# Patient Record
Sex: MALE | Race: WHITE | NOT HISPANIC OR LATINO
[De-identification: names, ages, dates, MRNs, and addresses within clinical notes are randomized per-mention and may not be internally consistent; named-entity substitution may affect disease eponyms.]

---

## 2021-07-17 ENCOUNTER — HEALTH MAINTENANCE LETTER (OUTPATIENT)
Age: 76
End: 2021-07-17

## 2021-08-09 VITALS — HEIGHT: 70 IN | WEIGHT: 215 LBS | BODY MASS INDEX: 30.78 KG/M2

## 2021-08-09 RX ORDER — WARFARIN SODIUM 10 MG/1
TABLET ORAL
COMMUNITY
Start: 2021-02-16

## 2021-08-09 RX ORDER — SIMVASTATIN 20 MG
20 TABLET ORAL
COMMUNITY
Start: 2021-01-27

## 2021-08-09 RX ORDER — WARFARIN SODIUM 7.5 MG/1
TABLET ORAL
COMMUNITY
Start: 2021-02-16

## 2021-08-09 RX ORDER — METFORMIN HCL 500 MG
500 TABLET, EXTENDED RELEASE 24 HR ORAL
COMMUNITY
Start: 2021-06-07

## 2021-08-09 RX ORDER — ASPIRIN 81 MG/1
81 TABLET, CHEWABLE ORAL
COMMUNITY
End: 2023-08-04

## 2021-08-09 RX ORDER — BUPROPION HYDROCHLORIDE 150 MG/1
TABLET ORAL
COMMUNITY
Start: 2021-06-07 | End: 2023-08-04

## 2021-08-09 RX ORDER — PANTOPRAZOLE SODIUM 40 MG/1
40 TABLET, DELAYED RELEASE ORAL
COMMUNITY
Start: 2021-01-27

## 2021-08-09 RX ORDER — TRIAMTERENE/HYDROCHLOROTHIAZID 37.5-25 MG
1 TABLET ORAL
COMMUNITY
Start: 2021-01-27

## 2021-08-09 ASSESSMENT — MIFFLIN-ST. JEOR: SCORE: 1711.48

## 2021-08-09 NOTE — PROGRESS NOTES
Sae is a 75 year old who is being evaluated via a billable video visit.      How would you like to obtain your AVS? MyChart  If the video visit is dropped, the invitation should be resent by: Send to e-mail at: [email or web address]  Will anyone else be joining your video visit? No        Video-Visit Details    Type of service:  Video Visit  Video Start Time: 1:36 PM  Video End Time:  2:14 PM    Originating Location (pt. Location): Home    Distant Location (provider location):  [facility]     Platform used for Video Visit: Siddhartha ALBERT CMA, SLEEP MEDICINE, 8/9/2021 3:30 PM      Sleep Consultation:    Date on this visit: 8/10/2021    Sae Cano is sent by No ref. provider found for a sleep consultation regarding establish care for HI on CPAP, needs new machine.    Primary Physician: No Ref-Primary, Physician     Sae Cano is a pleasant 75-year-old male with a PMH pertinent for history of pulmonary embolism and infarction, chronic anticoagulation, HTN, HLD, GERD, anxiety, depression, T2DM, BPH, WPW, mild obesity, and HI who reports doing well on CPAP. He is doing well on his CPAP at present and has been on a CPAP for several years, possibly initially seen in 2008.  He is here to establish care for obstructive sleep apnea on CPAP and would like to obtain a new prescription for a new CPAP device, mask and CPAP supplies.  He has a long history of HI with his initial sleep evaluation back in approximately 2008 when he started on CPAP.  His most recent sleep study was in November 11, 2013 through [facility] sleep clinic, however, the data from the report appears to be missing so it is unclear what his most recent AHI, RDI, or oxygen desaturation was during the study.  *8/17/2021 Update: PSG data reviewed and noted below.    Sae goes to sleep at 10:00 - 11:00 PM during the week. He wakes up at 6:00 - 7:00 AM without an alarm. He falls asleep in 5 minutes.  Sae  denies difficulty falling asleep.  He wakes up 1 times a night for variable to unable to return to sleep.  Sae wakes up to go to the bathroom.  On weekends, Sae goes to sleep at about the same time. Patient gets an average of 6 hours of sleep per night.     Patient does use electronics in bed, watch TV in bed and read in bed and does not worry in bed about anything.     Sae is retired, . He previously worked in the [employer] industry.    Sae does not snore with CPAP. Patient does have a regular bed partner. There is not report of snoring, kicking, punching, gasping, choking and snorting.  He does not have witnessed apneas. They never sleep separately.  Patient sleeps on his back and side. He has occasional morning dry mouth, denies occasional sleep disturbance, snort arousals, morning headaches, morning confusion and restless legs. Sae denies any bruxism, sleep walking, sleep talking, dream enactment, sleep paralysis, cataplexy and hypnogogic/hypnopompic hallucinations.    He denies sleep walking, sleep talking, enuresis and sleep terrors as a child.  Sae denies difficulty breathing through his nose, claustrophobia, reflux at night, heartburn and depression.      Sae has lost 0-5 pounds in the last year.  Patient describes themself as a morning person. Patient's [place] Sleepiness score 11/24 consistent with excessive  daytime sleepiness.      Sae naps 1-2 times per week for 30 minutes, feels refreshed after naps. He takes some inadvertant naps.  He denies closing eyes, dozing and falling asleep while driving.   Patient was counseled on the importance of driving while alert, to pull over if drowsy, or nap before getting into the vehicle if sleepy.  He uses 1 cups/day of green tea.    Alcohol use: Drinks maybe 1 beer/week or glass of wine  Nicotine use: None  Recreational Drug use: None    Previous Study Results: [facility]  Date: 11/11/2013.  Weight 215 pounds.  AHI: 52/hr. RDI 55/hr. O2 janee  82%.  Restricted AHI: 37/hr.    PAP machine: [employer] Dream Station Auto Pressure settings: 10-15 cm    The compliance data from 7/10/21 - 8/08/21 shows that the patient used the CPAP for 30/30 nights, 93.3% of nights for >4 hours.  The 90th% pressure is 11.5 cm.  The average time in large leak is 0 secs.  The average nightly usage is 6 hours 43 minutes.  The average AHI is 3.1/hr.     Interface:  Mask: nasal cushion mask  Chin strap: No  Leak: No  Using Humidifier: Yes  Condensation in hose or mask: Yes     Difficulties with therapy:    [-] Snoring with CPAP: None  [-] Difficulty tolerating the pressure: None  [-] Epistaxis/dry nose: None  [-] Nasal congestion: Occasional  [-] Dry mouth: Yes  [-] Mouth breathing: None  [-] Pain/skin breakdown: None     Improvements noted with CPAP:   [+] waking up more refreshed  [+] sleeping better with less arousals  [+] nocturia improved   [+] improved energy level during the day      Allergies:    No Known Allergies    Medications:    Current Outpatient Medications   Medication Sig Dispense Refill     aspirin (ASA) 81 MG chewable tablet Take 81 mg by mouth       Black Pepper-Turmeric (TURMERIC COMPLEX/BLACK PEPPER) 3-500 MG CAPS One Daily       blood glucose calibration (ONETOUCH ULTRA CONTROL) solution As directed.       buPROPion (WELLBUTRIN XL) 150 MG 24 hr tablet        metFORMIN (GLUCOPHAGE-XR) 500 MG 24 hr tablet Take 500 mg by mouth       Multiple Vitamin (MULTIVITAMIN PO)        Omega-3 Fatty Acids (OMEGA 3 PO)        pantoprazole (PROTONIX) 40 MG EC tablet Take 40 mg by mouth       polyethylene glycol-propylene glycol (SYSTANE ULTRA) 0.4-0.3 % SOLN ophthalmic solution Apply 1 drop to eye       simvastatin (ZOCOR) 20 MG tablet Take 20 mg by mouth       triamterene-HCTZ (MAXZIDE-25) 37.5-25 MG tablet Take 1 tablet by mouth       VITAMIN D PO        warfarin ANTICOAGULANT (COUMADIN) 10 MG tablet Take by mouth 10 mg (10 mg x 1) every Wed,   7.5 mg (7.5 mg x 1) all other  days OR as directed       warfarin ANTICOAGULANT (COUMADIN) 7.5 MG tablet Take by mouth 10 mg (10 mg x 1) every Wed, 7.5 mg (7.5 mg x 1) all other days OR as directed       Problem List:  There are no problems to display for this patient.  -Anxiety/depression  -T2DM  -HTN  -Esophageal reflux  -History pulmonary embolism and infarction  -Chronic anticoagulation (warfarin)  -Hyperlipidemia  -Obstructive sleep apnea  -Mable-Parkinson-White syndrome  -BPH    Past Medical/Surgical History:  No past medical history on file.  No past surgical history on file.    Social History:  Social History     Socioeconomic History     Marital status:      Spouse name: Not on file     Number of children: Not on file     Years of education: Not on file     Highest education level: Not on file   Occupational History     Not on file   Tobacco Use     Smoking status: Never Smoker     Smokeless tobacco: Never Used   Substance and Sexual Activity     Alcohol use: Not on file     Drug use: Not on file     Sexual activity: Not on file   Other Topics Concern     Not on file   Social History Narrative     Not on file     Social Determinants of Health     Financial Resource Strain:      Difficulty of Paying Living Expenses:    Food Insecurity:      Worried About Running Out of Food in the Last Year:      Ran Out of Food in the Last Year:    Transportation Needs:      Lack of Transportation (Medical):      Lack of Transportation (Non-Medical):    Physical Activity:      Days of Exercise per Week:      Minutes of Exercise per Session:    Stress:      Feeling of Stress :    Social Connections:      Frequency of Communication with Friends and Family:      Frequency of Social Gatherings with Friends and Family:      Attends Gnosticism Services:      Active Member of Clubs or Organizations:      Attends Club or Organization Meetings:      Marital Status:    Intimate Partner Violence:      Fear of Current or Ex-Partner:      Emotionally Abused:   "    Physically Abused:      Sexually Abused:        Family History:  No family history on file.    Review of Systems:  A complete review of systems reviewed by me is negative with the exeption of what has been mentioned in the history of present illness.  CONSTITUTIONAL: NEGATIVE for weight gain/loss, fever, chills, sweats or night sweats, drug allergies.  EYES: NEGATIVE for changes in vision, blind spots, double vision.  ENT: NEGATIVE for ear pain, sore throat, sinus pain, post-nasal drip, runny nose, bloody nose  CARDIAC: NEGATIVE for fast heartbeats or fluttering in chest, chest pain or pressure, breathlessness when lying flat, swollen legs or swollen feet.  NEUROLOGIC: NEGATIVE headaches, weakness or numbness in the arms or legs.  DERMATOLOGIC: NEGATIVE for rashes, new moles or change in mole(s)  PULMONARY: NEGATIVE SOB at rest, SOB with activity, dry cough, productive cough, coughing up blood, wheezing or whistling when breathing.    GASTROINTESTINAL: NEGATIVE for nausea or vomitting, loose or watery stools, fat or grease in stools, constipation, abdominal pain, bowel movements black in color or blood noted.  GENITOURINARY: NEGATIVE for pain during urination, blood in urine, urinating more frequently than usual, irregular menstrual periods.  MUSCULOSKELETAL: NEGATIVE for muscle pain, bone or joint pain, swollen joints.  ENDOCRINE: NEGATIVE for increased thirst or urination  ENDOCRINE:  POSITIVE for  diabetes  LYMPHATIC: NEGATIVE for swollen lymph nodes, lumps or bumps in the breasts or nipple discharge.    Physical Examination:  Vitals: Ht 1.778 m (5' 10\")   Wt 97.5 kg (215 lb)   BMI 30.85 kg/m    BMI= Body mass index is 30.85 kg/m .         [place] Total Score 8/9/2021   Total score - [place] 11          GENERAL APPEARANCE: healthy, alert, no distress and cooperative  EYES: Eyes grossly normal to inspection  RESP: Unlabored, easy breathing with normal conversational speech  CV: color normal  NEURO: Alert and " oriented x3, mentation intact and speech normal  PSYCH: mentation appears normal and affect normal/bright  Mallampati Class: Did not examine  Tonsillar Stage: Did not examine    Impression/Plan:  1. HI (obstructive sleep apnea)  - Comprehensive DME    This is a pleasant 75-year-old male with a PMH pertinent for history of pulmonary embolism and infarction, chronic anticoagulation, HTN, HLD, GERD, anxiety, depression, T2DM, BPH, WPW, mild obesity, and HI who reports doing well on CPAP. He is doing well on his CPAP at present and has been on a CPAP for several years, possibly initially seen in 2008.  He is here to establish care for obstructive sleep apnea on CPAP and would like to obtain a new prescription for a new CPAP device, mask and CPAP supplies.  He was previously seen at the [facility] for HI on CPAP.  We discussed the current nationwide [employer] PAP device recall campaign and its effects regarding CPAP supply and demand.  We also discussed indications for retesting for HI which include changes in neurologic, cardiopulmonary status, as well as significant changes in weight.  We will defer testing at this time to reevaluate his sleep and instead an order was placed for new APAP device, mask and supplies sent to [facility], per patient request.    Literature provided regarding sleep apnea.      He will follow up with me in approximately two months after obtaining his APAP device to review download data and use/compliance.    Polysomnography reviewed.  Obstructive sleep apnea reviewed.  Complications of untreated sleep apnea were reviewed.    45 minutes were spent on the date of the encounter doing chart review, history and exam, documentation and further activities as noted above.       DIMITRIOS Rodriguez CNP  Sleep Medicine    CC: No ref. provider found    This note was written with the assistance of the Dragon voice-dictation technology software. The final document,  although reviewed, may contain errors. For corrections, please contact the office.

## 2021-08-09 NOTE — PATIENT INSTRUCTIONS
Your BMI is Body mass index is 30.85 kg/m .  Weight management is a personal decision.  If you are interested in exploring weight loss strategies, the following discussion covers the approaches that may be successful. Body mass index (BMI) is one way to tell whether you are at a healthy weight, overweight, or obese. It measures your weight in relation to your height.  A BMI of 18.5 to 24.9 is in the healthy range. A person with a BMI of 25 to 29.9 is considered overweight, and someone with a BMI of 30 or greater is considered obese. More than two-thirds of American adults are considered overweight or obese.  Being overweight or obese increases the risk for further weight gain. Excess weight may lead to heart disease and diabetes.  Creating and following plans for healthy eating and physical activity may help you improve your health.  Weight control is part of healthy lifestyle and includes exercise, emotional health, and healthy eating habits. Careful eating habits lifelong are the mainstay of weight control. Though there are significant health benefits from weight loss, long-term weight loss with diet alone may be very difficult to achieve- studies show long-term success with dietary management in less than 10% of people. Attaining a healthy weight may be especially difficult to achieve in those with severe obesity. In some cases, medications, devices and surgical management might be considered.  What can you do?  If you are overweight or obese and are interested in methods for weight loss, you should discuss this with your provider.     Consider reducing daily calorie intake by 500 calories.     Keep a food journal.     Avoiding skipping meals, consider cutting portions instead.    Diet combined with exercise helps maintain muscle while optimizing fat loss. Strength training is particularly important for building and maintaining muscle mass. Exercise helps reduce stress, increase energy, and improves fitness.  "Increasing exercise without diet control, however, may not burn enough calories to loose weight.       Start walking three days a week 10-20 minutes at a time    Work towards walking thirty minutes five days a week     Eventually, increase the speed of your walking for 1-2 minutes at time    In addition, we recommend that you review healthy lifestyles and methods for weight loss available through the National Institutes of Health patient information sites:  http://win.niddk.nih.gov/publications/index.htm    And look into health and wellness programs that may be available through your health insurance provider, employer, local community center, or irma club.    MY INFORMATION ON SLEEP APNEA-  Sae Cano    DOCTOR : DIMITRIOS Rodriguez CNP  SLEEP CENTER :      MY CONTACT NUMBER:   [facility]  [phone]  [facility]   [phone]  [facility]   [phone]      [facility]  [phone]  [facility]   [phone]      [place]:  1. What is Obstructive Sleep Apnea (HI)? HI is the most common type of sleep apnea. Apnea literally means, \"without breath.\" It is characterized by repetitive pauses in breathing, despite continued effort to breathe, and is usually associated with a reduction in blood oxygen saturation. Apneas can last 10 to over 60 seconds. It is caused by narrowing or collapse of the upper airway as muscles relax during sleep.   2. What are the consequences of HI? Symptoms include: daytime sleepiness- possibly increasing the risk of falling asleep while driving, unrefreshing/restless sleep, snoring, insomnia, waking frequently to urinate, waking with heartburn or reflux, reduced concentration and memory, and morning headaches. Other health consequences may include development of high blood pressure. Untreated HI also can contribute to heart disease, stroke and diabetes.   3. What " are the treatment options? In most situations, sleep apnea is a lifelong disease that must be managed with daily therapy. Continuous Positive Airway (CPAP) is the most reliable treatment. A mouthguard to hold your jaw forward is usually the next most reliable option. Other options include postioning devices (to keep you off your back), nasal valves, tongue retaining device, weight loss, surgery. There is more detail about these options toward the end of this document.  4. What are the most important things to remember about using CPAP?     WHERE CAN I FIND MORE INFORMATION?    American Academy of Sleep Medicine Patient information on sleep disorders:  http://yoursleep.aasmnet.org    CPAP -  WHY AND HOW?                 Continuous positive airway pressure, or CPAP, is the most effective treatment for obstructive sleep apnea. It works by blowing room air, through a mask, to hold your throat open. A decision to use CPAP is a major step forward in the pursuit of a healthier life. The successful use of CPAP will help you breathe easier, sleep better and live healthier. Using CPAP can be a positive experience if you keep these galindo points in mind:  1. Commitment  CPAP is not a quick fix for your problem. It involves a long-term commitment to improve your sleep and your health.    2. Communication  Stay in close communication with both your sleep doctor and your CPAP supplier. Ask lots of questions and seek help when you need it.    3. Consistency  Use CPAP all night, every night and for every nap. You will receive the maximum health benefits from CPAP when you use it every time that you sleep. This will also make it easier for your body to adjust to the treatment.    4. Correction  The first machine and mask that you try may not be the best ones for you. Work with your sleep doctor and your CPAP supplier to make corrections to your equipment selection. Ask about trying a different type of machine or mask if you have  "ongoing problems. Make sure that your mask is a good fit and learn to use your equipment properly.    5. Challenge  Tell a family member or close friend to ask you each morning if you used your CPAP the previous night. Have someone to challenge you to give it your best effort.    6. Connection   Your adjustment to CPAP will be easier if you are able to connect with others who use the same treatment. Ask your sleep doctor if there is a support group in your area for people who have sleep apnea, or look for one on the Internet.  7. Comfort   Increase your level of comfort by using a saline spray, decongestant or heated humidifier if CPAP irritates your nose, mouth or throat. Use your unit's \"ramp\" setting to slowly get used to the air pressure level. There may be soft pads you can buy that will fit over your mask straps. Look on www.CPAP.com for accessories that can help make CPAP use more comfortable.  8. Cleaning   Clean your mask, tubing and headgear on a regular basis. Put this time in your schedule so that you don't forget to do it. Check and replace the filters for your CPAP unit and humidifier.    9. Completion   Although you are never finished with CPAP therapy, you should reward yourself by celebrating the completion of your first month of treatment. Expect this first month to be your hardest period of adjustment. It will involve some trial and error as you find the machine, mask and pressure settings that are right for you.    10. Continuation  After your first month of treatment, continue to make a daily commitment to use your CPAP all night, every night and for every nap.    CPAP-Tips to starting with success:  Begin using your CPAP for short periods of time during the day while you watch TV or read.    Use CPAP every night and for every nap. Using it less often reduces the health benefits and makes it harder for your body to get used to it.    Newer CPAP models are virtually silent; however, if you find " the sound of your CPAP machine to be bothersome, place the unit under your bed to dampen the sound.     Make small adjustments to your mask, tubing, straps and headgear until you get the right fit. Tightening the mask may actually worsen the leak.  If it leaves significant marks on your face or irritates the bridge of your nose, it may not be the best mask for you.  Speak with the person who supplied the mask and consider trying other masks. Insurances will allow you to try different masks during the first month of starting CPAP.  Insurance also covers a new mask, hose and filter about every 6 months.    Use a saline nasal spray to ease mild nasal congestion. Neti-Pot or saline nasal rinses may also help. Nasal gel sprays can help reduce nasal dryness.  Biotene mouthwash can be helpful to protect your teeth if you experience frequent dry mouth.  Dry mouth may be a sign of air escaping out of your mouth or out of the mask in the case of a full face mask.  Speak with your provider if you expect that is the case.     Take a nasal decongestant to relieve more severe nasal or sinus congestion.  Do not use Afrin (oxymetazoline) nasal spray more than 3 days in a row.  Speak with your sleep doctor if your nasal congestion is chronic.    Use a heated humidifier that fits your CPAP model to enhance your breathing comfort. Adjust the heat setting up if you get a dry nose or throat, down if you get condensation in the hose or mask.  Position the CPAP lower than you so that any condensation in the hose drains back into the machine rather than towards the mask.    Try a system that uses nasal pillows if traditional masks give you problems.    Clean your mask, tubing and headgear once a week. Make sure the equipment dries fully.    Regularly check and replace the filters for your CPAP unit and humidifier.    Work closely with your sleep provider and your CPAP supplier to make sure that you have the machine, mask and air pressure  setting that works best for you. It is better to stop using it and call your provider to solve problems than to lay awake all night frustrated with the device.    BESIDES CPAP, WHAT OTHER THERAPIES ARE THERE?    Postioning devices if you only have the snoring or apnea while on your back    Dental devices if your condition is mild    Nasal valves may be effective though experience is limited    Weight loss if you are overweight    Surgery in limited cases where devices are not acceptable or there are problems with structures in the nose and throat  If treated with one of these alternative options, further evaluation is necessary to ensure that the therapy is effective. This may require some form of repeat testing.      Healthy Lifestyle:  Healthy diet, exercise and limit alcohol: Not only will excessive alcohol increase your weight over time, but it irritates the throat tissues and make them swell, shrinking the airway and causing snoring. Drinking alcohol should be limited and stopped within 3-4 hours before going to bed.   Stop smoking: (Red swollen throat, heat, nicotine), also irritates and swells the airway, among numerous other negative health consequences.    Positioning Device  This example shows a pillow that straps around the waist. It may be appropriate for those whose sleep study shows milder sleep apnea that occurs primarily when lying flat on one's back. Preliminary studies have shown benefit but effectiveness at home should be verified.                      Oral Appliance  These are examples of two of many custom-made devices that are more likely to work in mild sleep apnea   Oral appliances are dental mouth pieces that fit very much like a sports mouth guards or removable orthodontic retainers. They are used to treat snoring and obstructive sleep apnea . The device prevents the airway from collapsing by either supporting the jaw in a forward position. Since oral appliances are non-invasive and easy to  use, they may be considered as an early treatment option. Oral appliance therapy (OAT) involves the customization, selection, fabrication, fitting, adjustments and long-term follow-up care.  Custom made oral appliances are proven to be more effective than over-the-counter devices. Therefore, the over-the-counter devices are recommended not to be used as a screening tool nor as a therapeutic option.     Who gets a dental device?  Oral appliance therapy can be used as an alternative to CPAP therapy for the treatment of mild to moderate sleep apnea and for those patients who prefer OAT to CPAP. Oral appliance therapy is a first line therapy for the treatment of primary snoring. Additionally, OAT is an option for those that cannot tolerate CPAP as therapy or who have experienced insufficient surgical results.                 Possible side effects?  Frequent but minor side effects include: excessive salivation, dry mouth, discomfort of teeth and jaw and temporary changes in the patient s bite.  Potential complications include: jaw pain, permanent occlusal changes and TMJ symptoms.  The above mentioned side effects and complications can be recognized and managed by dentists trained in dental sleep medicine.   Finding a dentist that practices dental sleep medicine  Specific training is available through the American Academy of Dental Sleep Medicine for dentists interested in working in the field of sleep. To find a dentist who is educated in the field of sleep and the use of oral appliances, near you, visit the Web site of the American Academy of Dental Sleep Medicine; also see http://www.accpstorage.org/newOrganization/patients/oralAppliances.pdf   To search for a dentist certified in these practices:  Http://aadsm.org/FindADentist.aspx?1  Nasal Valves              Nasal valves may be an option for mild apnea if other options are not well tolerated. The efficacy of these devices is generally less than CPAP or oral  appliances.They may not be effective if you have frequent nasal congestion or have difficulty breathing through your nose.   Weight Loss:    Weight loss decreases severity of sleep apnea in most people with obesity. For those with mild obesity who have developed snoring with weight gain, even 15-30 pound weight loss can improve and occasionally eliminate sleep apnea.  Structured and life-long dietary and health habits are necessary to lose weight and keep healthier weight levels.     Though there are significant health benefits from weight loss, long-term weight loss is very difficult to achieve- studies show success with dietary management in less than 10% of people. In addition, substantial weight loss may require years of dietary control and may be difficult if patients have severe obesity. In these cases, surgical management may be considered.    If you are interested in methods for weight loss, you should review the options discussed at the National Institutes of Health patient information sites:     http://win.niddk.nih.gov/publications/index.htm  http:/www.health.nih.gov/topic/WeightLossDieting    Bariatric programs offer counseling in all methods of weight loss:    Http:/www.uofedicalcenter.org/Specialties/WeightLossSurgeryandMedicalMgmt/htm    Your BMI is Body mass index is 30.85 kg/m .        Body mass index (BMI) is one way to tell whether you are at a healthy weight, overweight, or obese. It measures your weight in relation to your height.  A BMI of 18.5 to 24.9 is in the healthy range. A person with a BMI of 25 to 29.9 is considered overweight, and someone with a BMI of 30 or greater is considered obese.  Another way to find out if you are at risk for health problems caused by overweight and obesity is to measure your waist. If you are a woman and your waist is more than 35 inches, or if you are a man and your waist is more than 40 inches, your risk of disease may be higher.  More than two-thirds of  American adults are considered overweight or obese. Being overweight or obese increases the risk for further weight gain.  Excess weight may lead to heart disease and diabetes. Creating and following plans for healthy eating and physical activity may help you improve your health.    Methods for maintaining or losing weight.    Weight control is part of healthy lifestyle and includes exercise, emotional health, and healthy eating habits.  Careful eating habits lifelong is the mainstay of weight control.  Though there are significant health benefits from weight loss, long-term weight loss with diet alone may be very difficult to achieve- studies show long-term success with dietary management in less than 10% of people. Attaining a healthy weight may be especially difficult to achieve in those with severe obesity. In some cases, medications, devices and surgical management might be considered.    What can you do?    If you are overweight or obese and are interested in methods for weight loss, you should discuss this with your provider. In addition, we recommend that you review healthy life styles and methods for weight loss available through the National Institutes of Health patient information sites:     http://win.niddk.nih.gov/publications/index.htm      Surgery:  There are a number of surgeries that have been attempted to treat apnea. In general, surgical options are usually reserved for cases in which there is a physical abnormality contributing to obstruction or other treatment options are ineffective or not tolerated. Most surgical options are either unreliable or quite invasive. One of the more common procedures is:  Uvulopalatopharyngoplasty: In this procedure, the uvula (the finger-like tissue that hangs in the back of the throat), part of the soft palate (the tissue that the uvula is attached to), and sometimes the tonsils or adenoids are removed. The efficacy of this surgery is around 30-50% .  After  "surgery, complications may include:  Sleepiness and sleep apnea related to post-surgery medication   Swelling, infection and bleeding   A sore throat and/or difficulty swallowing   Drainage of secretions into the nose and a nasal quality to the voice. English language speech does not seem to be affected by this surgery.   Narrowing of the airway in the nose and throat (hence constricting breathing) snoring and even iatrogenically caused sleep apnea. By cutting the tissues, excess scar tissue can \"tighten\" the airway and make it even smaller than it was before UPPP.  Patients who have had the uvula removed will become unable to correctly speak Serbian or any other language that has a uvular 'r' phoneme.    Surgeries to help resolve nasal congestion may help reduce the severity of apnea slightly. Nasal congestion does not cause apnea on its own, so these surgeries are usually not performed just for HI.  They may be worth considering if the nasal congestion is significantly bothersome independent of apnea.      "

## 2021-08-10 ENCOUNTER — VIRTUAL VISIT (OUTPATIENT)
Dept: SLEEP MEDICINE | Facility: CLINIC | Age: 76
End: 2021-08-10
Payer: COMMERCIAL

## 2021-08-10 DIAGNOSIS — G47.33 OSA (OBSTRUCTIVE SLEEP APNEA): Primary | ICD-10-CM

## 2021-08-10 PROBLEM — E11.9 TYPE 2 DIABETES MELLITUS WITHOUT COMPLICATION, WITHOUT LONG-TERM CURRENT USE OF INSULIN (H): Status: ACTIVE | Noted: 2018-07-19

## 2021-08-10 PROBLEM — I20.0 UNSTABLE ANGINA (H): Status: ACTIVE | Noted: 2019-07-11

## 2021-08-10 PROCEDURE — 99204 OFFICE O/P NEW MOD 45 MIN: CPT | Mod: 95 | Performed by: NURSE PRACTITIONER

## 2021-08-12 NOTE — PROGRESS NOTES
Sleep study records have been requested from [facility] today.      Instructional letter for scheduling follow up visit have been sent to patient via [employer].     Order for Durable Medical Equipment was processed and equipment ordered.     DME provider: [facility]    Date Faxed: 8/12/2021    Ordering Provider: DIMITRIOS Rodriguez CNP    PAP Order Type: Replacement device    Fax Number: [phone]    Additional documents faxed:      Initial consults note   Face sheet  Snap shot of insurance information    Omaira ALBERT CMA, SLEEP MEDICINE, 8/12/2021 8:41 AM

## 2021-08-20 ENCOUNTER — DOCUMENTATION ONLY (OUTPATIENT)
Dept: SLEEP MEDICINE | Facility: CLINIC | Age: 76
End: 2021-08-20

## 2021-08-20 NOTE — PROGRESS NOTES
8/19/2021- NICHOLE- ATTEMPTED TO CALL PT TO SCHEDULE IN [facility],  PHONE JUST RANG AND RANG WITH NO ANSWER.

## 2021-09-11 ENCOUNTER — HEALTH MAINTENANCE LETTER (OUTPATIENT)
Age: 76
End: 2021-09-11

## 2022-01-01 ENCOUNTER — HEALTH MAINTENANCE LETTER (OUTPATIENT)
Age: 77
End: 2022-01-01

## 2022-01-27 DIAGNOSIS — G47.33 OSA (OBSTRUCTIVE SLEEP APNEA): Primary | ICD-10-CM

## 2022-03-31 DIAGNOSIS — G47.33 OSA (OBSTRUCTIVE SLEEP APNEA): Primary | ICD-10-CM

## 2022-04-06 ENCOUNTER — OFFICE VISIT (OUTPATIENT)
Dept: DERMATOLOGY | Facility: CLINIC | Age: 77
End: 2022-04-06
Payer: COMMERCIAL

## 2022-04-06 VITALS
WEIGHT: 222 LBS | BODY MASS INDEX: 31.85 KG/M2 | DIASTOLIC BLOOD PRESSURE: 90 MMHG | HEART RATE: 80 BPM | SYSTOLIC BLOOD PRESSURE: 143 MMHG

## 2022-04-06 DIAGNOSIS — D48.5 NEOPLASM OF UNCERTAIN BEHAVIOR OF SKIN: ICD-10-CM

## 2022-04-06 DIAGNOSIS — D18.01 CHERRY ANGIOMA: ICD-10-CM

## 2022-04-06 DIAGNOSIS — D22.9 MULTIPLE BENIGN NEVI: ICD-10-CM

## 2022-04-06 DIAGNOSIS — L57.0 ACTINIC KERATOSIS: Primary | ICD-10-CM

## 2022-04-06 DIAGNOSIS — L81.4 LENTIGO: ICD-10-CM

## 2022-04-06 DIAGNOSIS — L82.1 SEBORRHEIC KERATOSIS: ICD-10-CM

## 2022-04-06 PROCEDURE — 88305 TISSUE EXAM BY PATHOLOGIST: CPT | Performed by: DERMATOLOGY

## 2022-04-06 PROCEDURE — 11102 TANGNTL BX SKIN SINGLE LES: CPT | Performed by: PHYSICIAN ASSISTANT

## 2022-04-06 PROCEDURE — 99203 OFFICE O/P NEW LOW 30 MIN: CPT | Mod: 25 | Performed by: PHYSICIAN ASSISTANT

## 2022-04-06 PROCEDURE — 17003 DESTRUCT PREMALG LES 2-14: CPT | Mod: 59 | Performed by: PHYSICIAN ASSISTANT

## 2022-04-06 PROCEDURE — 17000 DESTRUCT PREMALG LESION: CPT | Mod: 59 | Performed by: PHYSICIAN ASSISTANT

## 2022-04-06 NOTE — PATIENT INSTRUCTIONS
Wound Care Instructions     FOR SUPERFICIAL WOUNDS     [facility], [facility] or    [facility] [phone]          AFTER 24 HOURS YOU SHOULD REMOVE THE BANDAGE AND BEGIN DAILY DRESSING CHANGES AS FOLLOWS:     1) Remove Dressing.     2) Clean and dry the area with tap water using a Q-tip or sterile gauze pad.     3) Apply Vaseline, Aquaphor, Polysporin ointment or Bacitracin ointment over entire wound.  Do NOT use Neosporin ointment.     4) Cover the wound with a band-aid, or a sterile non-stick gauze pad and micropore paper tape      REPEAT THESE INSTRUCTIONS AT LEAST ONCE A DAY UNTIL THE WOUND HAS COMPLETELY HEALED.    It is an old wives tale that a wound heals better when it is exposed to air and allowed to dry out. The wound will heal faster with a better cosmetic result if it is kept moist with ointment and covered with a bandage.    **Do not let the wound dry out.**      Supplies Needed:      *Cotton tipped applicators (Q-tips)    *Polysporin Ointment or Bacitracin Ointment (NOT NEOSPORIN)    *Band-aids or non-stick gauze pads and micropore paper tape.      PATIENT INFORMATION:    During the healing process you will notice a number of changes. All wounds develop a small halo of redness surrounding the wound.  This means healing is occurring. Severe itching with extensive redness usually indicates sensitivity to the ointment or bandage tape used to dress the wound.  You should call our office if this develops.      Swelling  and/or discoloration around your surgical site is common, particularly when performed around the eye.    All wounds normally drain.  The larger the wound the more drainage there will be.  After 7-10 days, you will notice the wound beginning to shrink and new skin will begin to grow.  The wound is healed when you can see skin has formed over the entire area.  A healed wound has a healthy, shiny look to the surface and is red to dark pink in color to normalize.   Wounds may take approximately 4-6 weeks to heal.  Larger wounds may take 6-8 weeks.  After the wound is healed you may discontinue dressing changes.    You may experience a sensation of tightness as your wound heals. This is normal and will gradually subside.    Your healed wound may be sensitive to temperature changes. This sensitivity improves with time, but if you re having a lot of discomfort, try to avoid temperature extremes.    Patients frequently experience itching after their wound appears to have healed because of the continue healing under the skin.  Plain Vaseline will help relieve the itching.        POSSIBLE COMPLICATIONS    BLEEDIN. Leave the bandage in place.  2. Use tightly rolled up gauze or a cloth to apply direct pressure over the bandage for 30  minutes.  3. Reapply pressure for an additional 30 minutes if necessary  4. Use additional gauze and tape to maintain pressure once the bleeding has stopped.  5.

## 2022-04-06 NOTE — PROGRESS NOTES
Sae Cano is an extremely pleasant 76 year old year old male patient here today for rough areas on face and scalp. No pain or bleeding. Patient has no other skin complaints today.  Remainder of the HPI, Meds, PMH, Allergies, FH, and SH was reviewed in chart.    Pertinent Hx:   No personal history of skin cancer.   No past medical history on file.    No past surgical history on file.     No family history on file.    Social History     Socioeconomic History     Marital status:      Spouse name: Not on file     Number of children: Not on file     Years of education: Not on file     Highest education level: Not on file   Occupational History     Not on file   Tobacco Use     Smoking status: Never Smoker     Smokeless tobacco: Never Used   Substance and Sexual Activity     Alcohol use: Not on file     Drug use: Not on file     Sexual activity: Not on file   Other Topics Concern     Not on file   Social History Narrative     Not on file     Social Determinants of Health     Financial Resource Strain: Not on file   Food Insecurity: Not on file   Transportation Needs: Not on file   Physical Activity: Not on file   Stress: Not on file   Social Connections: Not on file   Intimate Partner Violence: Not on file   Housing Stability: Not on file       Outpatient Encounter Medications as of 4/6/2022   Medication Sig Dispense Refill     aspirin (ASA) 81 MG chewable tablet Take 81 mg by mouth       Black Pepper-Turmeric (TURMERIC COMPLEX/BLACK PEPPER) 3-500 MG CAPS One Daily       blood glucose calibration (ONETOUCH ULTRA CONTROL) solution As directed.       buPROPion (WELLBUTRIN XL) 150 MG 24 hr tablet        metFORMIN (GLUCOPHAGE-XR) 500 MG 24 hr tablet Take 500 mg by mouth       Multiple Vitamin (MULTIVITAMIN PO)        Omega-3 Fatty Acids (OMEGA 3 PO)        pantoprazole (PROTONIX) 40 MG EC tablet Take 40 mg by mouth       polyethylene glycol-propylene glycol (SYSTANE ULTRA) 0.4-0.3 % SOLN ophthalmic solution  Apply 1 drop to eye       simvastatin (ZOCOR) 20 MG tablet Take 20 mg by mouth       triamterene-HCTZ (MAXZIDE-25) 37.5-25 MG tablet Take 1 tablet by mouth       VITAMIN D PO        warfarin ANTICOAGULANT (COUMADIN) 10 MG tablet Take by mouth 10 mg (10 mg x 1) every Wed,   7.5 mg (7.5 mg x 1) all other days OR as directed       warfarin ANTICOAGULANT (COUMADIN) 7.5 MG tablet Take by mouth 10 mg (10 mg x 1) every Wed, 7.5 mg (7.5 mg x 1) all other days OR as directed       No facility-administered encounter medications on file as of 4/6/2022.             O:   NAD, WDWN, Alert & Oriented, Mood & Affect wnl, Vitals stable   Here today alone   BP (!) 143/90   Pulse 80   Wt 100.7 kg (222 lb)   BMI 31.85 kg/m     General appearance normal   Vitals stable   Alert, oriented and in no acute distress     Gritty papules on scalp, temples, cheeks x 10  0.5 cm pink papule on left superior sternum   Stuck on papules and brown macules on trunk and ext   Red papules on trunk     The remainder of skin exam is normal       Eyes: Conjunctivae/lids:Normal     ENT: Lips: normal    MSK:Normal    Cardiovascular: peripheral edema none    Pulm: Breathing Normal    Neuro/Psych: Orientation:Alert and Orientedx3 ; Mood/Affect:normal   A/P:  1. R/O BCC on left superior sternum   TANGENTIAL BIOPSY SENT OUT:  After consent, anesthesia with LEC and prep, tangential excision performed and specimen sent out for permanent section histology.  No complications and routine wound care. Patient told to call our office in 1-2 weeks for result.      2. Actinic keratoses on scalp and face x 10   LN2:  Treated with LN2 for 5s for 1-2 cycles. Warned risks of blistering, pain, pigment change, scarring, and incomplete resolution.  Advised patient to return if lesions do not completely resolve.  Wound care sheet given.  Consider pdt in the future.  3. Seborrheic keratosis, lentigo, angioma  BENIGN LESIONS DISCUSSED WITH PATIENT:  I discussed the specifics of  tumor, prognosis, and genetics of benign lesions.  I explained that treatment of these lesions would be purely cosmetic and not medically neccessary.  I discussed with patient different removal options including excision, cautery and /or laser.      Nature and genetics of benign skin lesions dicussed with patient.  Signs and Symptoms of skin cancer discussed with patient.  ABCDEs of melanoma reviewed with patient.  Patient encouraged to perform monthly skin exams.  UV precautions reviewed with patient.   Risks of non-melanoma skin cancer discussed with patient   Return to clinic pending biopsy results.   Sae to follow up with Primary Care provider regarding elevated blood pressure.

## 2022-04-06 NOTE — LETTER
4/6/2022         RE: Sae Cano  [address] [facility] [address]        Dear Colleague,    Thank you for referring your patient, Sae Cano, to the [facility]. Please see a copy of my visit note below.    Sae Cano is an extremely pleasant 76 year old year old male patient here today for rough areas on face and scalp. No pain or bleeding. Patient has no other skin complaints today.  Remainder of the HPI, Meds, PMH, Allergies, FH, and SH was reviewed in chart.    Pertinent Hx:   No personal history of skin cancer.   No past medical history on file.    No past surgical history on file.     No family history on file.    Social History     Socioeconomic History     Marital status:      Spouse name: Not on file     Number of children: Not on file     Years of education: Not on file     Highest education level: Not on file   Occupational History     Not on file   Tobacco Use     Smoking status: Never Smoker     Smokeless tobacco: Never Used   Substance and Sexual Activity     Alcohol use: Not on file     Drug use: Not on file     Sexual activity: Not on file   Other Topics Concern     Not on file   Social History Narrative     Not on file     Social Determinants of Health     Financial Resource Strain: Not on file   Food Insecurity: Not on file   Transportation Needs: Not on file   Physical Activity: Not on file   Stress: Not on file   Social Connections: Not on file   Intimate Partner Violence: Not on file   Housing Stability: Not on file       Outpatient Encounter Medications as of 4/6/2022   Medication Sig Dispense Refill     aspirin (ASA) 81 MG chewable tablet Take 81 mg by mouth       Black Pepper-Turmeric (TURMERIC COMPLEX/BLACK PEPPER) 3-500 MG CAPS One Daily       blood glucose calibration (ONETOUCH ULTRA CONTROL) solution As directed.       buPROPion (WELLBUTRIN XL) 150 MG 24 hr tablet        metFORMIN (GLUCOPHAGE-XR) 500 MG 24 hr tablet Take  500 mg by mouth       Multiple Vitamin (MULTIVITAMIN PO)        Omega-3 Fatty Acids (OMEGA 3 PO)        pantoprazole (PROTONIX) 40 MG EC tablet Take 40 mg by mouth       polyethylene glycol-propylene glycol (SYSTANE ULTRA) 0.4-0.3 % SOLN ophthalmic solution Apply 1 drop to eye       simvastatin (ZOCOR) 20 MG tablet Take 20 mg by mouth       triamterene-HCTZ (MAXZIDE-25) 37.5-25 MG tablet Take 1 tablet by mouth       VITAMIN D PO        warfarin ANTICOAGULANT (COUMADIN) 10 MG tablet Take by mouth 10 mg (10 mg x 1) every Wed,   7.5 mg (7.5 mg x 1) all other days OR as directed       warfarin ANTICOAGULANT (COUMADIN) 7.5 MG tablet Take by mouth 10 mg (10 mg x 1) every Wed, 7.5 mg (7.5 mg x 1) all other days OR as directed       No facility-administered encounter medications on file as of 4/6/2022.             O:   NAD, WDWN, Alert & Oriented, Mood & Affect wnl, Vitals stable   Here today alone   BP (!) 143/90   Pulse 80   Wt 100.7 kg (222 lb)   BMI 31.85 kg/m     General appearance normal   Vitals stable   Alert, oriented and in no acute distress     Gritty papules on scalp, temples, cheeks x 10  0.5 cm pink papule on left superior sternum   Stuck on papules and brown macules on trunk and ext   Red papules on trunk     The remainder of skin exam is normal       Eyes: Conjunctivae/lids:Normal     ENT: Lips: normal    MSK:Normal    Cardiovascular: peripheral edema none    Pulm: Breathing Normal    Neuro/Psych: Orientation:Alert and Orientedx3 ; Mood/Affect:normal   A/P:  1. R/O BCC on left superior sternum   TANGENTIAL BIOPSY SENT OUT:  After consent, anesthesia with LEC and prep, tangential excision performed and specimen sent out for permanent section histology.  No complications and routine wound care. Patient told to call our office in 1-2 weeks for result.      2. Actinic keratoses on scalp and face x 10   LN2:  Treated with LN2 for 5s for 1-2 cycles. Warned risks of blistering, pain, pigment change, scarring,  and incomplete resolution.  Advised patient to return if lesions do not completely resolve.  Wound care sheet given.  Consider pdt in the future.  3. Seborrheic keratosis, lentigo, angioma  BENIGN LESIONS DISCUSSED WITH PATIENT:  I discussed the specifics of tumor, prognosis, and genetics of benign lesions.  I explained that treatment of these lesions would be purely cosmetic and not medically neccessary.  I discussed with patient different removal options including excision, cautery and /or laser.      Nature and genetics of benign skin lesions dicussed with patient.  Signs and Symptoms of skin cancer discussed with patient.  ABCDEs of melanoma reviewed with patient.  Patient encouraged to perform monthly skin exams.  UV precautions reviewed with patient.   Risks of non-melanoma skin cancer discussed with patient   Return to clinic pending biopsy results.   Sae to follow up with Primary Care provider regarding elevated blood pressure.          Again, thank you for allowing me to participate in the care of your patient.        Sincerely,        Cecile Teague PA-C

## 2022-04-08 LAB
PATH REPORT.COMMENTS IMP SPEC: NORMAL
PATH REPORT.COMMENTS IMP SPEC: NORMAL
PATH REPORT.FINAL DX SPEC: NORMAL
PATH REPORT.GROSS SPEC: NORMAL
PATH REPORT.MICROSCOPIC SPEC OTHER STN: NORMAL
PATH REPORT.RELEVANT HX SPEC: NORMAL

## 2022-04-23 ENCOUNTER — HEALTH MAINTENANCE LETTER (OUTPATIENT)
Age: 77
End: 2022-04-23

## 2022-06-24 ENCOUNTER — VIRTUAL VISIT (OUTPATIENT)
Dept: SLEEP MEDICINE | Facility: CLINIC | Age: 77
End: 2022-06-24
Payer: COMMERCIAL

## 2022-06-24 VITALS — BODY MASS INDEX: 30.64 KG/M2 | HEIGHT: 70 IN | WEIGHT: 214 LBS

## 2022-06-24 DIAGNOSIS — G47.33 OSA (OBSTRUCTIVE SLEEP APNEA): Primary | ICD-10-CM

## 2022-06-24 PROCEDURE — 99214 OFFICE O/P EST MOD 30 MIN: CPT | Mod: 95 | Performed by: NURSE PRACTITIONER

## 2022-06-24 ASSESSMENT — SLEEP AND FATIGUE QUESTIONNAIRES
HOW LIKELY ARE YOU TO NOD OFF OR FALL ASLEEP WHILE SITTING QUIETLY AFTER LUNCH WITHOUT ALCOHOL: HIGH CHANCE OF DOZING
HOW LIKELY ARE YOU TO NOD OFF OR FALL ASLEEP WHILE SITTING AND READING: HIGH CHANCE OF DOZING
HOW LIKELY ARE YOU TO NOD OFF OR FALL ASLEEP IN A CAR, WHILE STOPPED FOR A FEW MINUTES IN TRAFFIC: WOULD NEVER DOZE
HOW LIKELY ARE YOU TO NOD OFF OR FALL ASLEEP WHILE LYING DOWN TO REST IN THE AFTERNOON WHEN CIRCUMSTANCES PERMIT: HIGH CHANCE OF DOZING
HOW LIKELY ARE YOU TO NOD OFF OR FALL ASLEEP WHEN YOU ARE A PASSENGER IN A CAR FOR AN HOUR WITHOUT A BREAK: MODERATE CHANCE OF DOZING
HOW LIKELY ARE YOU TO NOD OFF OR FALL ASLEEP WHILE SITTING AND TALKING TO SOMEONE: WOULD NEVER DOZE
HOW LIKELY ARE YOU TO NOD OFF OR FALL ASLEEP WHILE WATCHING TV: MODERATE CHANCE OF DOZING
HOW LIKELY ARE YOU TO NOD OFF OR FALL ASLEEP WHILE SITTING INACTIVE IN A PUBLIC PLACE: WOULD NEVER DOZE

## 2022-06-24 NOTE — PROGRESS NOTES
Sae is a 76 year old who is being evaluated via a billable video visit.      How would you like to obtain your AVS? [email or web address]  If the video visit is dropped, the invitation should be resent by: Send to e-mail at: [email or web address]  Will anyone else be joining your video visit? Kaia Hebert        Video-Visit Details    Video Start Time: 2:34 PM    Type of service:  Video Visit    Video End Time:  2:55 PM    Originating Location (pt. Location): Home    Distant Location (provider location):  [facility]     Platform used for Video Visit: [facility]         Sleep Apnea - Follow-up Visit:    Impression/Plan:  1. HI (obstructive sleep apnea)    Severe Sleep apnea.  Patient discontinued CPAP therapy due to PAP recall since her last visit.  We discussed that the patient should restart using his CPAP device if still functional as he notes that he has never used an ozone or UV cleaning device on his machine and his risk for issues associated with the PAP recall are low.  Daytime symptoms are worsened off CPAP.     Previous orders for new APAP device noted in the HPI where likely sent to his requested DME, [facility].  I have asked the patient to contact [facility] to see if they can process his DME order for new APAP device.  If the patient needs a new DME order he will contact me via [employer]BV[employer] messaging and an order will be placed to soon as possible.    The patient is still considering possible Inspire neurostimulator therapy and we discussed the need for updating his sleep study with regard to current severity of HI.  I will discuss this with our providers at our next neurostimulator meeting.    Sae Cano will follow up in about 2 month(s) after obtaining his new APAP device to review download data and use/compliance.     Thirty minutes spent with patient, all of which were spent face-to-face counseling, consulting, chart review/documentation, and coordinating  plan of care on the date of the encounter.      DIMITRIOS Rodriguez CNP  Sleep Medicine      CC:  No Ref-Primary, Physician,         History of Present Illness:  Chief Complaint   Patient presents with     CPAP recalled and needs to use something       Sae Cano is a pleasant 76-year-old male with a PMH pertinent for history of pulmonary embolism and infarction, chronic anticoagulation, HTN, HLD, GERD, anxiety, depression, T2DM, BPH, WPW, mild obesity, and HI who presents for follow-up of their severe sleep apnea, managed with CPAP. He reports issues with stomach problems and has had endoscopy.  The patient notes that he found out that [facility] DME is not covered under his health care plan.    **My initial order for new APAP device, mask and supplies was on 8/10/2021 to [facility], per patient request.  **Second order for new APAP device, mask and supplies was on 3/31/2022 to [facility] in [place], [place], per patient request.  **In conjunction with patient's wishes to be considered for Inspire -hypoglossal neurostimulator device for sleep apnea, Dr. William Tejada placed an order for an updated sleep study (PSG) -the patient has not completed this to this date.    DME: [facility]     Previous Study Results: [facility]  Date: 11/11/2013.  Weight 215 pounds.  AHI: 52/hr. RDI 55/hr. O2 janee 82%.  Restricted AHI: 37/hr.       EPWORTH SLEEPINESS SCALE      [place] Sleepiness Scale ( TRE Gutierrez  1990-1997[facility] [place]/English - Final version - 21 Nov 07 - [facility] [place].) 6/24/2022   Sitting and reading High chance of dozing   Watching TV Moderate chance of dozing   Sitting, inactive in a public place (e.g. a theatre or a meeting) Would never doze   As a passenger in a car for an hour without a break Moderate chance of dozing   Lying down to rest in the afternoon when circumstances permit High chance of dozing   Sitting and talking to someone Would never doze   Sitting quietly  after a lunch without alcohol High chance of dozing   In a car, while stopped for a few minutes in traffic Would never doze   Laughlin Score (MC) 13   Laughlin Score (Sleep) 13       INSOMNIA SEVERITY INDEX (ALEKSANDR)      Insomnia Severity Index (ALEKSANDR) 6/24/2022   Difficulty falling asleep 0   Difficulty staying asleep 4   Problems waking up too early 4   How SATISFIED/DISSATISFIED are you with your CURRENT sleep pattern? 3   How NOTICEABLE to others do you think your sleep problem is in terms of impairing the quality of your life? 2   How WORRIED/DISTRESSED are you about your current sleep problem? 4   To what extent do you consider your sleep problem to INTERFERE with your daily functioning (e.g. daytime fatigue, mood, ability to function at work/daily chores, concentration, memory, mood, etc.) CURRENTLY? 4   ALEKSANDR Total Score 21       Guidelines for Scoring/Interpretation:  Total score categories:  0-7 = No clinically significant insomnia   8-14 = Subthreshold insomnia   15-21 = Clinical insomnia (moderate severity)  22-28 = Clinical insomnia (severe)  Used via courtesy of www.[employer].va.gov with permission from Barrington Araujo PhD., [facility]      Past medical/surgical history, family history, social history, medications and allergies were reviewed.        Problem List:  Patient Active Problem List    Diagnosis Date Noted     Unstable angina (H) 07/11/2019     Priority: Medium     Type 2 diabetes mellitus without complication, without long-term current use of insulin (H) 07/19/2018     Priority: Medium     Anticoagulation monitoring, INR range 2-3 07/14/2015     Priority: Medium     WPW (Mable-Parkinson-White syndrome) 02/04/2010     Priority: Medium     Formatting of this note might be different from the original.  No preexcitation on recent ECGs.       Major depressive disorder, single episode, mild (H) 02/02/2009     Priority: Medium     Benign prostatic hyperplasia 03/13/2008     Priority: Medium      "Pulmonary embolism and infarction (H) 02/29/2008     Priority: Medium     Formatting of this note might be different from the original.  2/08 Had PE - admit to [facility] -- Echo showed severe pulm htn  1/09 Recurrent - admit to [facility] - placed back on coumadin - no hypercoaguable state identified    Had negative stress cardiolite 2/08.       Sleep apnea 02/29/2008     Priority: Medium     Hyperlipidemia 02/13/2008     Priority: Medium     Family history of stroke (cerebrovascular) 05/10/2007     Priority: Medium     Essential hypertension 04/26/2006     Priority: Medium      Current Outpatient Medications   Medication     Black Pepper-Turmeric 3-500 MG CAPS     blood glucose calibration (ONETOUCH ULTRA CONTROL) solution     metFORMIN (GLUCOPHAGE-XR) 500 MG 24 hr tablet     Omega-3 Fatty Acids (OMEGA 3 PO)     pantoprazole (PROTONIX) 40 MG EC tablet     polyethylene glycol-propylene glycol (SYSTANE ULTRA) 0.4-0.3 % SOLN ophthalmic solution     simvastatin (ZOCOR) 20 MG tablet     triamterene-HCTZ (MAXZIDE-25) 37.5-25 MG tablet     VITAMIN D PO     warfarin ANTICOAGULANT (COUMADIN) 10 MG tablet     warfarin ANTICOAGULANT (COUMADIN) 7.5 MG tablet     aspirin (ASA) 81 MG chewable tablet     buPROPion (WELLBUTRIN XL) 150 MG 24 hr tablet     Multiple Vitamin (MULTIVITAMIN PO)     No current facility-administered medications for this visit.     No vitals taken at this visit.    Ht 1.778 m (5' 10\")   Wt 97.1 kg (214 lb)   BMI 30.71 kg/m          This note was written with the assistance of the Dragon voice-dictation technology software. The final document, although reviewed, may contain errors. For corrections, please contact the office.    "

## 2022-06-24 NOTE — PATIENT INSTRUCTIONS

## 2022-06-27 ENCOUNTER — TELEPHONE (OUTPATIENT)
Dept: SLEEP MEDICINE | Facility: CLINIC | Age: 77
End: 2022-06-27

## 2022-07-13 NOTE — NURSING NOTE
Writer has pended a [employer] message for patient. The message states to follow up up after receiving their CPAP Device. It will be sent out on 8/1/2022.

## 2022-08-13 ENCOUNTER — HEALTH MAINTENANCE LETTER (OUTPATIENT)
Age: 77
End: 2022-08-13

## 2022-10-30 ENCOUNTER — HEALTH MAINTENANCE LETTER (OUTPATIENT)
Age: 77
End: 2022-10-30

## 2022-12-17 ENCOUNTER — HEALTH MAINTENANCE LETTER (OUTPATIENT)
Age: 77
End: 2022-12-17

## 2022-12-26 ENCOUNTER — TELEPHONE (OUTPATIENT)
Dept: SCHEDULING | Facility: CLINIC | Age: 77
End: 2022-12-26

## 2022-12-26 NOTE — TELEPHONE ENCOUNTER
Reason for call:  Other   Patient called regarding (reason for call): call back  Additional comments: Pt has a CPAP that is recalled. Pt needs the contact that has access to his device settings. Pt last saw Gualberto Mar. He needs info on Gualberto and contact info and then also his device settings. Please call the patient to relay this information.     Phone number to reach patient:  Home number on file [phone] (home)    Best Time:  anytime    Can we leave a detailed message on this number?  YES    Travel screening: Not Applicable

## 2022-12-28 NOTE — TELEPHONE ENCOUNTER
Returned call to patient.  He doesn't need any info, he was just informing us that [facility] would be contacting us about a script for new machine.

## 2023-04-08 ENCOUNTER — HEALTH MAINTENANCE LETTER (OUTPATIENT)
Age: 78
End: 2023-04-08

## 2023-08-04 ENCOUNTER — OFFICE VISIT (OUTPATIENT)
Dept: OPHTHALMOLOGY | Facility: CLINIC | Age: 78
End: 2023-08-04
Payer: COMMERCIAL

## 2023-08-04 DIAGNOSIS — H53.002 AMBLYOPIA OF LEFT EYE: ICD-10-CM

## 2023-08-04 DIAGNOSIS — H52.223 HYPEROPIA OF BOTH EYES WITH REGULAR ASTIGMATISM AND PRESBYOPIA: ICD-10-CM

## 2023-08-04 DIAGNOSIS — H52.03 HYPEROPIA OF BOTH EYES WITH REGULAR ASTIGMATISM AND PRESBYOPIA: ICD-10-CM

## 2023-08-04 DIAGNOSIS — E11.9 TYPE 2 DIABETES MELLITUS WITHOUT COMPLICATION, WITHOUT LONG-TERM CURRENT USE OF INSULIN (H): Primary | ICD-10-CM

## 2023-08-04 DIAGNOSIS — H35.3121 EARLY DRY STAGE NONEXUDATIVE AGE-RELATED MACULAR DEGENERATION OF LEFT EYE: ICD-10-CM

## 2023-08-04 DIAGNOSIS — H52.4 HYPEROPIA OF BOTH EYES WITH REGULAR ASTIGMATISM AND PRESBYOPIA: ICD-10-CM

## 2023-08-04 PROBLEM — R19.8 DIGESTIVE SYMPTOM: Status: ACTIVE | Noted: 2020-08-04

## 2023-08-04 PROBLEM — K30 INDIGESTION: Status: ACTIVE | Noted: 2023-02-06

## 2023-08-04 PROBLEM — D12.0 BENIGN NEOPLASM OF CECUM: Status: ACTIVE | Noted: 2020-09-14

## 2023-08-04 PROBLEM — D12.2 BENIGN NEOPLASM OF ASCENDING COLON: Status: ACTIVE | Noted: 2020-09-14

## 2023-08-04 PROBLEM — R14.2 BURPING: Status: ACTIVE | Noted: 2023-02-06

## 2023-08-04 PROBLEM — G47.00 INSOMNIA, UNSPECIFIED: Status: ACTIVE | Noted: 2022-06-29

## 2023-08-04 PROBLEM — K63.5 POLYP OF COLON: Status: ACTIVE | Noted: 2020-09-10

## 2023-08-04 PROBLEM — E66.9 OBESITY: Status: ACTIVE | Noted: 2022-06-29

## 2023-08-04 PROBLEM — Z86.0100 HISTORY OF COLONIC POLYPS: Status: ACTIVE | Noted: 2021-10-19

## 2023-08-04 PROBLEM — H53.129 TRANSIENT VISUAL LOSS: Status: ACTIVE | Noted: 2023-02-06

## 2023-08-04 PROBLEM — A41.9 SEPSIS (H): Status: ACTIVE | Noted: 2017-01-30

## 2023-08-04 PROBLEM — R10.13 EPIGASTRIC PAIN: Status: ACTIVE | Noted: 2023-02-06

## 2023-08-04 PROCEDURE — 92015 DETERMINE REFRACTIVE STATE: CPT | Performed by: STUDENT IN AN ORGANIZED HEALTH CARE EDUCATION/TRAINING PROGRAM

## 2023-08-04 PROCEDURE — 92004 COMPRE OPH EXAM NEW PT 1/>: CPT | Performed by: STUDENT IN AN ORGANIZED HEALTH CARE EDUCATION/TRAINING PROGRAM

## 2023-08-04 RX ORDER — VITS A,C,E/LUTEIN/MINERALS 300MCG-200
1 TABLET ORAL DAILY
COMMUNITY

## 2023-08-04 ASSESSMENT — CONF VISUAL FIELD
OS_INFERIOR_TEMPORAL_RESTRICTION: 0
OS_INFERIOR_NASAL_RESTRICTION: 0
OD_SUPERIOR_NASAL_RESTRICTION: 0
OD_SUPERIOR_TEMPORAL_RESTRICTION: 0
OD_NORMAL: 1
OD_INFERIOR_NASAL_RESTRICTION: 0
OS_NORMAL: 1
OS_SUPERIOR_TEMPORAL_RESTRICTION: 0
OS_SUPERIOR_NASAL_RESTRICTION: 0
OD_INFERIOR_TEMPORAL_RESTRICTION: 0

## 2023-08-04 ASSESSMENT — TONOMETRY
OD_IOP_MMHG: 15
OS_IOP_MMHG: 17
IOP_METHOD: APPLANATION

## 2023-08-04 ASSESSMENT — VISUAL ACUITY
OS_CC: CF @4'
OD_CC: 1+
CORRECTION_TYPE: GLASSES
OD_CC: 20/25
METHOD: SNELLEN - LINEAR
OD_CC+: -1

## 2023-08-04 ASSESSMENT — REFRACTION_MANIFEST
OS_CYLINDER: +1.25
OD_ADD: +2.75
OD_AXIS: 014
OS_SPHERE: +4.50
OS_AXIS: 158
OD_SPHERE: +1.75
OD_CYLINDER: +0.75

## 2023-08-04 ASSESSMENT — REFRACTION_WEARINGRX
OS_ADD: +2.50
OS_SPHERE: +2.00
OD_ADD: +2.50
OS_AXIS: 013
OD_SPHERE: +2.00
OD_CYLINDER: +0.75
OS_CYLINDER: +0.75
SPECS_TYPE: PAL
OD_AXIS: 003

## 2023-08-04 ASSESSMENT — SLIT LAMP EXAM - LIDS
COMMENTS: 1+ DERMATOCHALASIS
COMMENTS: 1+ DERMATOCHALASIS

## 2023-08-04 ASSESSMENT — EXTERNAL EXAM - RIGHT EYE: OD_EXAM: BROW PTOSIS

## 2023-08-04 ASSESSMENT — CUP TO DISC RATIO
OD_RATIO: 0.3
OS_RATIO: 0.25

## 2023-08-04 NOTE — PATIENT INSTRUCTIONS
Glasses prescription given     Keep blood sugars and blood pressure under good control.    Recommend taking an eye vitamin with AREDS2 on the packaging (like Preservision, iCaps, or generic). Follow dosing directions on the package.      Keyana Mendosa MD  [phone]    Patient Education   Diabetes weakens the blood vessels all over the body, including the eyes. Damage to the blood vessels in the eyes can cause swelling or bleeding into part of the eye (called the retina). This is called diabetic retinopathy ([facility]-tin--[facility]-thee). If not treated, this disease can cause vision loss or blindness.   Symptoms may include blurred or distorted vision, but many people have no symptoms. It's important to see your eye doctor regularly to check for problems.   Early treatment and good control can help protect your vision. Here are the things you can do to help prevent vision loss:      1. Keep your blood sugar levels under tight control.      2. Bring high blood pressure under control.      3. No smoking.      4. Have yearly dilated eye exams.

## 2023-08-04 NOTE — LETTER
8/4/2023         RE: Sae Cano  [address] [facility] [address]        Dear Colleague,    Thank you for referring your patient, Sae Cano, to the [facility]. Please see a copy of my visit note below.     Current Eye Medications:  artificial tears both eyes as needed.  Ocuvite generic daily.       Subjective:  Patient is here for a Diabetic Eye Exam.  Glasses are scratched, but vision is adequate in each eye with correction.   Last  A1C 6.4 about 6 months ago.      Last eye exam:  1+ year at [facility] in [place].  He was unable to get an appointment for an extended amount of time, so he decided to change to Dr. Mendosa.      Amblyopia left eye.  History of patching, but no surgery.    No family history of glaucoma.    No previous eye injuries or surgeries.     Objective:  See Ophthalmology Exam.       Assessment:  Sae Cano is a 77 year old male who presents with:   Encounter Diagnoses   Name Primary?     Type 2 diabetes mellitus without complication, without long-term current use of insulin (H) Negative diabetic retinopathy      Amblyopia of left eye      Early dry stage nonexudative age-related macular degeneration of left eye  Discussed AREDS2     Hyperopia of both eyes with regular astigmatism and presbyopia        Plan:  Glasses prescription given     Keep blood sugars and blood pressure under good control.    Recommend taking an eye vitamin with AREDS2 on the packaging (like Preservision, iCaps, or generic). Follow dosing directions on the package.      Keyana Mendosa MD  [phone]    Patient Education  Diabetes weakens the blood vessels all over the body, including the eyes. Damage to the blood vessels in the eyes can cause swelling or bleeding into part of the eye (called the retina). This is called diabetic retinopathy (MONTSERRAT-tin-AH-puh-thee). If not treated, this disease can cause vision loss or blindness.   Symptoms may  include blurred or distorted vision, but many people have no symptoms. It's important to see your eye doctor regularly to check for problems.   Early treatment and good control can help protect your vision. Here are the things you can do to help prevent vision loss:      1. Keep your blood sugar levels under tight control.      2. Bring high blood pressure under control.      3. No smoking.      4. Have yearly dilated eye exams.         Again, thank you for allowing me to participate in the care of your patient.        Sincerely,        Keyana Mendosa MD

## 2023-08-04 NOTE — PROGRESS NOTES
Current Eye Medications:  artificial tears both eyes as needed.  Ocuvite generic daily.       Subjective:  Patient is here for a Diabetic Eye Exam.  Glasses are scratched, but vision is adequate in each eye with correction.   Last  A1C 6.4 about 6 months ago.      Last eye exam:  1+ year at [facility] in [place].  He was unable to get an appointment for an extended amount of time, so he decided to change to Dr. Mendosa.      Amblyopia left eye.  History of patching, but no surgery.    No family history of glaucoma.    No previous eye injuries or surgeries.     Objective:  See Ophthalmology Exam.       Assessment:  Sae Cano is a 77 year old male who presents with:   Encounter Diagnoses   Name Primary?    Type 2 diabetes mellitus without complication, without long-term current use of insulin (H) Negative diabetic retinopathy     Amblyopia of left eye     Early dry stage nonexudative age-related macular degeneration of left eye  Discussed AREDS2    Hyperopia of both eyes with regular astigmatism and presbyopia        Plan:  Glasses prescription given     Keep blood sugars and blood pressure under good control.    Recommend taking an eye vitamin with AREDS2 on the packaging (like Preservision, iCaps, or generic). Follow dosing directions on the package.      Keyana Mendosa MD  [phone]    Patient Education  Diabetes weakens the blood vessels all over the body, including the eyes. Damage to the blood vessels in the eyes can cause swelling or bleeding into part of the eye (called the retina). This is called diabetic retinopathy (MONTSERRAT-[id number]-AH-puh-thee). If not treated, this disease can cause vision loss or blindness.   Symptoms may include blurred or distorted vision, but many people have no symptoms. It's important to see your eye doctor regularly to check for problems.   Early treatment and good control can help protect your vision. Here are the things you can do to help prevent vision loss:       1. Keep your blood sugar levels under tight control.      2. Bring high blood pressure under control.      3. No smoking.      4. Have yearly dilated eye exams.

## 2023-09-03 ENCOUNTER — HEALTH MAINTENANCE LETTER (OUTPATIENT)
Age: 78
End: 2023-09-03

## 2024-01-21 ENCOUNTER — HEALTH MAINTENANCE LETTER (OUTPATIENT)
Age: 79
End: 2024-01-21

## 2024-06-09 ENCOUNTER — HEALTH MAINTENANCE LETTER (OUTPATIENT)
Age: 79
End: 2024-06-09

## 2024-10-27 ENCOUNTER — HEALTH MAINTENANCE LETTER (OUTPATIENT)
Age: 79
End: 2024-10-27

## 2025-02-01 ENCOUNTER — HEALTH MAINTENANCE LETTER (OUTPATIENT)
Age: 80
End: 2025-02-01

## 2025-05-04 ENCOUNTER — HEALTH MAINTENANCE LETTER (OUTPATIENT)
Age: 80
End: 2025-05-04

## 2025-08-17 ENCOUNTER — HEALTH MAINTENANCE LETTER (OUTPATIENT)
Age: 80
End: 2025-08-17